# Patient Record
Sex: FEMALE | Race: WHITE | NOT HISPANIC OR LATINO | Employment: FULL TIME | URBAN - METROPOLITAN AREA
[De-identification: names, ages, dates, MRNs, and addresses within clinical notes are randomized per-mention and may not be internally consistent; named-entity substitution may affect disease eponyms.]

---

## 2017-06-14 ENCOUNTER — RADIANT APPOINTMENT (OUTPATIENT)
Dept: MAMMOGRAPHY | Facility: CLINIC | Age: 42
End: 2017-06-14
Attending: OBSTETRICS & GYNECOLOGY
Payer: COMMERCIAL

## 2017-06-14 DIAGNOSIS — Z12.31 VISIT FOR SCREENING MAMMOGRAM: ICD-10-CM

## 2017-06-14 PROCEDURE — G0202 SCR MAMMO BI INCL CAD: HCPCS | Performed by: RADIOLOGY

## 2018-06-25 ENCOUNTER — RADIANT APPOINTMENT (OUTPATIENT)
Dept: MAMMOGRAPHY | Facility: CLINIC | Age: 43
End: 2018-06-25
Attending: OBSTETRICS & GYNECOLOGY
Payer: COMMERCIAL

## 2018-06-25 DIAGNOSIS — Z12.31 VISIT FOR SCREENING MAMMOGRAM: ICD-10-CM

## 2018-06-25 PROCEDURE — 77067 SCR MAMMO BI INCL CAD: CPT | Performed by: STUDENT IN AN ORGANIZED HEALTH CARE EDUCATION/TRAINING PROGRAM

## 2019-07-03 ENCOUNTER — ANCILLARY PROCEDURE (OUTPATIENT)
Dept: MAMMOGRAPHY | Facility: CLINIC | Age: 44
End: 2019-07-03
Attending: OBSTETRICS & GYNECOLOGY
Payer: COMMERCIAL

## 2019-07-03 DIAGNOSIS — Z12.31 VISIT FOR SCREENING MAMMOGRAM: ICD-10-CM

## 2019-07-03 PROCEDURE — 77067 SCR MAMMO BI INCL CAD: CPT

## 2020-07-07 ENCOUNTER — ANCILLARY PROCEDURE (OUTPATIENT)
Dept: MAMMOGRAPHY | Facility: CLINIC | Age: 45
End: 2020-07-07
Attending: OBSTETRICS & GYNECOLOGY
Payer: COMMERCIAL

## 2020-07-07 DIAGNOSIS — Z12.31 VISIT FOR SCREENING MAMMOGRAM: ICD-10-CM

## 2020-07-07 PROCEDURE — 77067 SCR MAMMO BI INCL CAD: CPT

## 2020-12-03 ENCOUNTER — THERAPY VISIT (OUTPATIENT)
Dept: PHYSICAL THERAPY | Facility: CLINIC | Age: 45
End: 2020-12-03
Payer: COMMERCIAL

## 2020-12-03 DIAGNOSIS — M25.521 RIGHT ELBOW PAIN: ICD-10-CM

## 2020-12-03 PROCEDURE — 97110 THERAPEUTIC EXERCISES: CPT | Mod: GP | Performed by: PHYSICAL THERAPIST

## 2020-12-03 PROCEDURE — 97161 PT EVAL LOW COMPLEX 20 MIN: CPT | Mod: GP | Performed by: PHYSICAL THERAPIST

## 2020-12-03 NOTE — PROGRESS NOTES
[facility] Initial Evaluation  Subjective:    Patient Health History  Jacob Nguyen being seen for R elbow pain.     Problem began: 12/3/2019.   Problem occurred: unknown, 1month constant pain and on and off for a year   Pain is reported as 3/10 on pain scale.  General health as reported by patient is good.  Pertinent medical history includes: high blood pressure, migraines/headaches, overweight and sleep disorder/apnea.   Red flags:  None as reported by patient.   Other medical allergies details: sulfa.   Surgeries include:  Other. Other surgery history details: , foot surgery on toe, fibroid removal.    Current medications:  High blood pressure medication, muscle relaxants and pain medication. Other medications details: acid reflux, migraines, anti-anxiety.    Current occupation is  of [employer], [employer].   Primary job tasks include:  Computer work and prolonged sitting.                                    Objective:      ELBOW:   PROM L PROM R AROM L AROM R MMT L MMT R   Flex   150 150 5/5 5/5   Ext   0 0 5/5 5/5   Hyper Ext   5 5     Pronation     5/5 5/5   Supination     5/5 5/5     WRIST:   PROM L PROM R AROM L AROM R MMT L MMT R   Flex     5/5 5/5   Ext     5/5 5/5   Radial Dev         Ulnar Dev               30,25,25 31,31,25     SHOULDER:   PROM L PROM R AROM L AROM R MMT L MMT R   Flex   180 180 4/5 5/5   Abd   180 180 4/5 5/5   Full Can         Empty Can         IR         ER   70 70 5/5 4/5   Ext/IR             ELBOW/WRIST:  Palpation: TTP olecranon process and just medial to olecranon protuberance but anterior to ulnar notch  Special Testing:   L R   Ligament     Valgus 0 degrees neg neg   Valgus 30 degrees neg neg   Milking     Dynamic Milking     Lateral Epicondylitis     Resisted wrist ext/radial dev neg neg   Resisted finger extension (ECRB) neg neg   Passive stretch (wrist flex/pronation) neg neg   Medial Epicondylitis     Resisted wrist  flex/ulnar deviation neg neg   Passive stretch (wrist ext/supination) neg neg   Nerve Testing     Tinel's     Nerve tension testing         Unable to formally reproduce symptoms with special tests, end range elbow flexion reproduced symptoms mildly and compressing olecranon process reproduced symptoms mildly, suspect olecranon bursitis or unusual presentation and mechanism for an epicondylitis.         System    Physical Exam    General     ROS    Assessment/Plan:    Patient is a 45 year old female with right side elbow complaints.    Patient has the following significant findings with corresponding treatment plan.                Diagnosis 1:  R elbow pain  Pain -  hot/cold therapy, US, electric stimulation, manual therapy, education and home program  Decreased strength - therapeutic exercise, therapeutic activities and home program    Cumulative Therapy Evaluation is: Low complexity.    Previous and current functional limitations:  (See Goal Flow Sheet for this information)    Short term and Long term goals: (See Goal Flow Sheet for this information)     Communication ability:  Patient appears to be able to clearly communicate and understand verbal and written communication and follow directions correctly.  Treatment Explanation - The following has been discussed with the patient:   RX ordered/plan of care  Anticipated outcomes  Possible risks and side effects  This patient would benefit from PT intervention to resume normal activities.   Rehab potential is good.    Frequency:  1 X week, once daily  Duration:  for 4 weeks  Discharge Plan:  Achieve all LTG.  Independent in home treatment program.  Reach maximal therapeutic benefit.    Please refer to the daily flowsheet for treatment today, total treatment time and time spent performing 1:1 timed codes.

## 2020-12-03 NOTE — LETTER
[facility] [place]  [address]  [place] [address]  [phone]    December 3, 2020    Re: Jacob Nguyen   :   1975  MRN:  [id number]   REFERRING PHYSICIAN:   Haylie GUIDO [facility] [place] [facility]    Date of Initial Evaluation:  12/3/20  Visits:  Rxs Used: 1  Reason for Referral:  Right elbow pain    EVALUATION SUMMARY    [facility] Initial Evaluation  Subjective:    Patient Health History  Jacob Nguyen being seen for R elbow pain.     Problem began: 12/3/2019.   Problem occurred: unknown, 1month constant pain and on and off for a year   Pain is reported as 3/10 on pain scale.  General health as reported by patient is good.  Pertinent medical history includes: high blood pressure, migraines/headaches, overweight and sleep disorder/apnea.   Red flags:  None as reported by patient.   Other medical allergies details: sulfa.   Surgeries include:  Other. Other surgery history details: , foot surgery on toe, fibroid removal.    Current medications:  High blood pressure medication, muscle relaxants and pain medication. Other medications details: acid reflux, migraines, anti-anxiety.    Current occupation is  of [employer] found[employer], [employer].   Primary job tasks include:  Computer work and prolonged sitting.                        Objective:    ELBOW:   PROM L PROM R AROM L AROM R MMT L MMT R   Flex   150 150 5/5 5/5   Ext   0 0 5/5 5/5   Hyper Ext   5 5     Pronation     5/5 5/5   Supination     5/5 5/5     WRIST:   PROM L PROM R AROM L AROM R MMT L MMT R   Flex     5/5 5/5   Ext     5/5 5/5   Radial Dev         Ulnar Dev               30,25,25 31,31,25     SHOULDER:   PROM L PROM R AROM L AROM R MMT L MMT R   Flex   180 180 4/5 5/5   Abd   180 180 4/5 5/5   Full Can         Empty Can         IR         ER   70 70 5/5 4/5   Ext/IR             ELBOW/WRIST:  Palpation: TTP olecranon process and just medial to olecranon protuberance but anterior to ulnar  notch    Special Testing:   L R   Ligament     Valgus 0 degrees neg neg   Valgus 30 degrees neg neg   Milking     Dynamic Milking     Lateral Epicondylitis     Resisted wrist ext/radial dev neg neg   Resisted finger extension (ECRB) neg neg   Passive stretch (wrist flex/pronation) neg neg   Medial Epicondylitis     Resisted wrist flex/ulnar deviation neg neg   Passive stretch (wrist ext/supination) neg neg   Nerve Testing     Tinel's     Nerve tension testing       Unable to formally reproduce symptoms with special tests, end range elbow flexion reproduced symptoms mildly and compressing olecranon process reproduced symptoms mildly, suspect olecranon bursitis or unusual presentation and mechanism for an epicondylitis.       Assessment/Plan:    Patient is a 45 year old female with right side elbow complaints.    Patient has the following significant findings with corresponding treatment plan.                Diagnosis 1:  R elbow pain  Pain -  hot/cold therapy, US, electric stimulation, manual therapy, education and home program  Decreased strength - therapeutic exercise, therapeutic activities and home program    Cumulative Therapy Evaluation is: Low complexity.    Previous and current functional limitations:  (See Goal Flow Sheet for this information)    Short term and Long term goals: (See Goal Flow Sheet for this information)     Communication ability:  Patient appears to be able to clearly communicate and understand verbal and written communication and follow directions correctly.  Treatment Explanation - The following has been discussed with the patient:   RX ordered/plan of care  Anticipated outcomes  Possible risks and side effects  This patient would benefit from PT intervention to resume normal activities.   Rehab potential is good.    Frequency:  1 X week, once daily  Duration:  for 4 weeks  Discharge Plan:  Achieve all LTG.  Independent in home treatment program.  Reach maximal therapeutic  benefit.            Thank you for your referral.    INQUIRIES  Therapist: NEPTALI Mejia [facility]  [address]  [place] [address]  Phone: [phone]  Fax: [phone]

## 2020-12-10 ENCOUNTER — THERAPY VISIT (OUTPATIENT)
Dept: PHYSICAL THERAPY | Facility: CLINIC | Age: 45
End: 2020-12-10
Payer: COMMERCIAL

## 2020-12-10 DIAGNOSIS — M25.521 RIGHT ELBOW PAIN: ICD-10-CM

## 2020-12-10 PROCEDURE — 97010 HOT OR COLD PACKS THERAPY: CPT | Mod: GP | Performed by: PHYSICAL THERAPIST

## 2020-12-10 PROCEDURE — 97035 APP MDLTY 1+ULTRASOUND EA 15: CPT | Mod: GP | Performed by: PHYSICAL THERAPIST

## 2020-12-10 PROCEDURE — 97110 THERAPEUTIC EXERCISES: CPT | Mod: GP | Performed by: PHYSICAL THERAPIST

## 2020-12-22 ENCOUNTER — THERAPY VISIT (OUTPATIENT)
Dept: PHYSICAL THERAPY | Facility: CLINIC | Age: 45
End: 2020-12-22
Payer: COMMERCIAL

## 2020-12-22 DIAGNOSIS — M25.521 RIGHT ELBOW PAIN: ICD-10-CM

## 2020-12-22 PROCEDURE — 97110 THERAPEUTIC EXERCISES: CPT | Mod: GP | Performed by: PHYSICAL THERAPIST

## 2020-12-22 PROCEDURE — 97035 APP MDLTY 1+ULTRASOUND EA 15: CPT | Mod: GP | Performed by: PHYSICAL THERAPIST

## 2020-12-22 PROCEDURE — 97010 HOT OR COLD PACKS THERAPY: CPT | Mod: GP | Performed by: PHYSICAL THERAPIST

## 2020-12-31 ENCOUNTER — THERAPY VISIT (OUTPATIENT)
Dept: PHYSICAL THERAPY | Facility: CLINIC | Age: 45
End: 2020-12-31
Payer: COMMERCIAL

## 2020-12-31 DIAGNOSIS — M25.521 RIGHT ELBOW PAIN: ICD-10-CM

## 2020-12-31 PROCEDURE — 97110 THERAPEUTIC EXERCISES: CPT | Mod: GP | Performed by: PHYSICAL THERAPIST

## 2020-12-31 PROCEDURE — 97035 APP MDLTY 1+ULTRASOUND EA 15: CPT | Mod: GP | Performed by: PHYSICAL THERAPIST

## 2020-12-31 NOTE — LETTER
BRODIE GUIDO [facility] [place]  [address]  [place] [address]  [phone]    2021    Re: Jacob Nguyen   :   1975  MRN:  [id number]   REFERRING PHYSICIAN:   [place]lie GUIDO [facility] [place]    Date of Initial Evaluation:  12/3/20  Visits:  Rxs Used: 4  Reason for Referral:  Right elbow pain    PROGRESS  REPORT  Progress reporting period is from 12/3/20 to 20.     SUBJECTIVE  Subjective: pt reports she was flared up for a day or two but then it has been ok since, no pain. has been avoiding/modifying how she moves, feels some tightness when doing elbow flexion but not pain. tender to push on elbow just occasionally and if she is flared up only, not constant tenderness life prior to starting PT.   Current Pain level: 0/10   Initial Pain level: 3/10   Changes in function: Yes, see goal flow sheet for change in function   Adverse reactions: None    The objective findings are from DOS 20.    OBJECTIVE  Objective: valgus and varus testing negative, mild tenderness over olecranon process, able to perform sup<>sit transfers with 1/10 pain/discomfort.      ASSESSMENT/PLAN  Updated problem list and treatment plan: Diagnosis 1:  R elbow pain  STG/LTGs have been met or progress has been made towards goals:  Yes, progressing to painfree bed mobility  Assessment of Progress: The patient's condition is improving.  Self Management Plans:  Patient has been instructed in a home treatment program.  I have re-evaluated this patient and find that the nature, scope, duration and intensity of the therapy is appropriate for the medical condition of the patient.  Jacob continues to require the following intervention to meet STG and LTG's: PT    Recommendations:  Jacob has made some improvement in R elbow pain but still has some pain with end range elbow flexion and when placing pressure onto elbow and she would benefit from further PT services, 1x every 2weeks [id number].    Thank you for your  referral.    INQUIRIES  Therapist: NEPTALI Mejia [facility]  [address]  [place] [address]  Phone: [phone]  Fax: [phone]

## 2020-12-31 NOTE — PROGRESS NOTES
Subjective:  HPI  Physical Exam                    Objective:  System    Physical Exam    General     ROS    Assessment/Plan:    PROGRESS  REPORT    Progress reporting period is from 12/3/20 to 12/31/20.     SUBJECTIVE  Subjective: pt reports she was flared up for a day or two but then it has been ok since, no pain. has been avoiding/modifying how she moves, feels some tightness when doing elbow flexion but not pain. tender to push on elbow just occasionally and if she is flared up only, not constant tenderness life prior to starting PT.   Current Pain level: 0/10   Initial Pain level: 3/10   Changes in function: Yes, see goal flow sheet for change in function   Adverse reactions: None;   ,     The objective findings are from DOS 12/31/20.    OBJECTIVE  Objective: valgus and varus testing negative, mild tenderness over olecranon process, able to perform sup<>sit transfers with 1/10 pain/discomfort.      ASSESSMENT/PLAN  Updated problem list and treatment plan: Diagnosis 1:  R elbow pain  STG/LTGs have been met or progress has been made towards goals:  Yes, progressing to painfree bed mobility  Assessment of Progress: The patient's condition is improving.  Self Management Plans:  Patient has been instructed in a home treatment program.  I have re-evaluated this patient and find that the nature, scope, duration and intensity of the therapy is appropriate for the medical condition of the patient.  Jacob continues to require the following intervention to meet STG and LTG's: PT    Recommendations:  Jacob has made some improvement in R elbow pain but still has some pain with end range elbow flexion and when placing pressure onto elbow and she would benefit from further PT services, 1x every 2weeks [id number].    Please refer to the daily flowsheet for treatment today, total treatment time and time spent performing 1:1 timed codes.

## 2021-01-15 ENCOUNTER — HEALTH MAINTENANCE LETTER (OUTPATIENT)
Age: 46
End: 2021-01-15

## 2021-07-14 ENCOUNTER — ANCILLARY PROCEDURE (OUTPATIENT)
Dept: MAMMOGRAPHY | Facility: CLINIC | Age: 46
End: 2021-07-14
Attending: OBSTETRICS & GYNECOLOGY
Payer: COMMERCIAL

## 2021-07-14 DIAGNOSIS — Z12.31 VISIT FOR SCREENING MAMMOGRAM: ICD-10-CM

## 2021-07-14 PROCEDURE — 77067 SCR MAMMO BI INCL CAD: CPT | Mod: GC | Performed by: RADIOLOGY

## 2021-07-14 PROCEDURE — 77063 BREAST TOMOSYNTHESIS BI: CPT | Mod: GC | Performed by: RADIOLOGY

## 2021-07-23 ENCOUNTER — HOSPITAL ENCOUNTER (OUTPATIENT)
Dept: MAMMOGRAPHY | Facility: CLINIC | Age: 46
End: 2021-07-23
Attending: OBSTETRICS & GYNECOLOGY
Payer: COMMERCIAL

## 2021-07-23 DIAGNOSIS — R92.8 ABNORMAL MAMMOGRAM: ICD-10-CM

## 2021-07-23 PROCEDURE — 77065 DX MAMMO INCL CAD UNI: CPT | Mod: RT

## 2021-09-04 ENCOUNTER — HEALTH MAINTENANCE LETTER (OUTPATIENT)
Age: 46
End: 2021-09-04

## 2021-11-17 ENCOUNTER — MEDICAL CORRESPONDENCE (OUTPATIENT)
Dept: MAMMOGRAPHY | Facility: CLINIC | Age: 46
End: 2021-11-17
Payer: COMMERCIAL

## 2022-01-12 ENCOUNTER — ANCILLARY PROCEDURE (OUTPATIENT)
Dept: MAMMOGRAPHY | Facility: CLINIC | Age: 47
End: 2022-01-12
Attending: OBSTETRICS & GYNECOLOGY
Payer: COMMERCIAL

## 2022-01-12 DIAGNOSIS — R92.30 DENSE BREAST: ICD-10-CM

## 2022-01-12 PROCEDURE — 77065 DX MAMMO INCL CAD UNI: CPT | Mod: RT

## 2022-02-19 ENCOUNTER — HEALTH MAINTENANCE LETTER (OUTPATIENT)
Age: 47
End: 2022-02-19

## 2022-10-16 ENCOUNTER — HEALTH MAINTENANCE LETTER (OUTPATIENT)
Age: 47
End: 2022-10-16

## 2022-11-09 ENCOUNTER — THERAPY VISIT (OUTPATIENT)
Dept: PHYSICAL THERAPY | Facility: CLINIC | Age: 47
End: 2022-11-09
Payer: COMMERCIAL

## 2022-11-09 DIAGNOSIS — M54.2 NECK PAIN: ICD-10-CM

## 2022-11-09 DIAGNOSIS — G89.29 CHRONIC BACK PAIN: Primary | ICD-10-CM

## 2022-11-09 DIAGNOSIS — M54.9 CHRONIC BACK PAIN: Primary | ICD-10-CM

## 2022-11-09 PROCEDURE — 97110 THERAPEUTIC EXERCISES: CPT | Mod: GP | Performed by: PHYSICAL THERAPIST

## 2022-11-09 PROCEDURE — 97161 PT EVAL LOW COMPLEX 20 MIN: CPT | Mod: GP | Performed by: PHYSICAL THERAPIST

## 2022-11-09 NOTE — LETTER
[facility] [place]  [address] [place] [address]  [place] [address]  [phone]    November 10, 2022    Re: Jacob Nguyen   :   1975  MRN:  [id number]   REFERRING PHYSICIAN:   Karine BENZ [facility] [place]    Date of Initial Evaluation:  2022  Visits:  Rxs Used: 1  Reason for Referral:     Chronic back pain  Neck pain    EVALUATION SUMMARY    Physical Therapy Initial Evaluation  Subjective:    Patient Health History  Jacob Nguyen being seen for back and neck issues.     Problem began: 2022.   Problem occurred: stress   Pain is reported as 3/10 on pain scale.  General health as reported by patient is good.  Pertinent medical history includes: high blood pressure, history of fractures, menopausal, mental illness, migraines/headaches and sleep disorder/apnea.   Red flags:  None as reported by patient.   Other medical allergies details: sulfa, eurethomycin.   Surgeries include:  Orthopedic surgery and other. Other surgery history details: toe joint, .    Current medications:  High blood pressure medication and muscle relaxants. Other medications details: anxiety, GERD, migraines, steroids as needed for migraines.    Current occupation is [employer].   Primary job tasks include:  Computer work and prolonged sitting.                Pt has a chronic history of LBP, recently went to [place] and had increased pain with prolonged standing and walking. She has also had increased neck pain over the past few months and believes this is due to recent increased demands and stress at work.    Objective:    LUMBAR:    Posture: slouched sitting        Re: Jacob Nguyen   :   1975    Neurological:    Motor Deficit:  Myotomes L R   L1-2 (hip flexion) 5/5 5/5   L3 (knee extension) 5/5 5/5   L4 (ankle DF) 5/5 5/5   L5 (g. toe ext)     S1 (ankle PF or knee flex) 5/5 5/5     Sensory Deficit, Reflexes: normal  light touch sensation     Dural Signs:   L R   Slump neg neg   SLR neg neg   Other:     AROM: (Major, Moderate, Minimal or Nil loss)  Movement Loss Abhishek Mod Min Nil Pain   Flexion   x     Extension    x    Side Gliding L    x    Side Gliding R    x + on R     Repeated movement testing:   (During: produces, abolishes, increases, decreases, no effect, centralizing, peripheralizing; After: better, worse, no better, no worse, no effect, centralized, peripheralized)    Pre-test Symptoms Standing:    Symptoms During Symptoms After ROM increased ROM decreased No Effect   FIS     x   Rep FIS     x   EIS     x   Rep EIS     x     If required Pre-test Symptoms:    Symptoms During Symptoms After ROM increased ROM decreased No Effect   SGIS - L     x   Rep SGIS - L     x   SGIS - R x       Rep SGIS - R x    same     Static Tests: spring testing negative but tender over L L5-S1  Other Tests: prefers seated/flexed positions however will get relief from standing walking but increased standing/walking causes increased pain with relief from sitting  Re: Jacob Nguyen   :   1975    System  Physical Exam    Assessment/Plan:    Patient is a 47 year old female with lumbar and cervical complaints.    Patient has the following significant findings with corresponding treatment plan.                Diagnosis 1:  Back and neck  Pain -  hot/cold therapy, manual therapy, education, directional preference exercise and home program  Decreased ROM/flexibility - manual therapy, therapeutic exercise, therapeutic activity and home program  Impaired posture - neuro re-education and therapeutic activities    Cumulative Therapy Evaluation is: Low complexity.  Previous and current functional limitations:  (See Goal Flow Sheet for this information)    Short term and Long term goals: (See Goal Flow Sheet for this information)   Communication ability:  Patient appears to be able to clearly communicate and understand verbal and written  communication and follow directions correctly.  Treatment Explanation - The following has been discussed with the patient:   RX ordered/plan of care  Anticipated outcomes  Possible risks and side effects  This patient would benefit from PT intervention to resume normal activities.   Rehab potential is fair.  Frequency:  1 X week, once daily  Duration:  for 12 weeks  Discharge Plan:  Achieve all LTG.  Independent in home treatment program.  Reach maximal therapeutic benefit.    Thank you for your referral.    INQUIRIES  Therapist: Simon Ovalle PT  [address] [address]  Phone: [phone]  Fax: [phone]

## 2022-11-09 NOTE — PROGRESS NOTES
Physical Therapy Initial Evaluation  Subjective:    Patient Health History  Jacob Nguyen being seen for back and neck issues.     Problem began: 2022.   Problem occurred: stress   Pain is reported as 3/10 on pain scale.  General health as reported by patient is good.  Pertinent medical history includes: high blood pressure, history of fractures, menopausal, mental illness, migraines/headaches and sleep disorder/apnea.   Red flags:  None as reported by patient.   Other medical allergies details: sulfa, eurethomycin.   Surgeries include:  Orthopedic surgery and other. Other surgery history details: toe joint, .    Current medications:  High blood pressure medication and muscle relaxants. Other medications details: anxiety, GERD, migraines, steroids as needed for migraines.    Current occupation is [employer].   Primary job tasks include:  Computer work and prolonged sitting.                                Pt has a chronic history of LBP, recently went to [employer] and had increased pain with prolonged standing and walking. She has also had increased neck pain over the past few months and believes this is due to recent increased demands and stress at work.    Objective:      LUMBAR:    Posture: slouched sitting    Neurological:    Motor Deficit:  Myotomes L R   L1-2 (hip flexion) 5/5 5/5   L3 (knee extension) 5/5 5/5   L4 (ankle DF) 5/5 5/5   L5 (g. toe ext)     S1 (ankle PF or knee flex) 5/5 5/5     Sensory Deficit, Reflexes: normal light touch sensation     Dural Signs:   L R   Slump neg neg   SLR neg neg   Other:     AROM: (Major, Moderate, Minimal or Nil loss)  Movement Loss Abhishek Mod Min Nil Pain   Flexion   x     Extension    x    Side Gliding L    x    Side Gliding R    x + on R     Repeated movement testing:   (During: produces, abolishes, increases, decreases, no effect, centralizing, peripheralizing; After: better, worse, no better, no worse, no effect, centralized,  peripheralized)    Pre-test Symptoms Standing:    Symptoms During Symptoms After ROM increased ROM decreased No Effect   FIS     x   Rep FIS     x   EIS     x   Rep EIS     x     If required Pre-test Symptoms:    Symptoms During Symptoms After ROM increased ROM decreased No Effect   SGIS - L     x   Rep SGIS - L     x   SGIS - R x       Rep SGIS - R x    same     Static Tests: spring testing negative but tender over L L5-S1  Other Tests: prefers seated/flexed positions however will get relief from standing walking but increased standing/walking causes increased pain with relief from sitting            System    Physical Exam    General     ROS    Assessment/Plan:    Patient is a 47 year old female with lumbar and cervical complaints.    Patient has the following significant findings with corresponding treatment plan.                Diagnosis 1:  Back and neck  Pain -  hot/cold therapy, manual therapy, education, directional preference exercise and home program  Decreased ROM/flexibility - manual therapy, therapeutic exercise, therapeutic activity and home program  Impaired posture - neuro re-education and therapeutic activities    Cumulative Therapy Evaluation is: Low complexity.    Previous and current functional limitations:  (See Goal Flow Sheet for this information)    Short term and Long term goals: (See Goal Flow Sheet for this information)     Communication ability:  Patient appears to be able to clearly communicate and understand verbal and written communication and follow directions correctly.  Treatment Explanation - The following has been discussed with the patient:   RX ordered/plan of care  Anticipated outcomes  Possible risks and side effects  This patient would benefit from PT intervention to resume normal activities.   Rehab potential is fair.    Frequency:  1 X week, once daily  Duration:  for 12 weeks  Discharge Plan:  Achieve all LTG.  Independent in home treatment program.  Reach maximal  therapeutic benefit.    Please refer to the daily flowsheet for treatment today, total treatment time and time spent performing 1:1 timed codes.

## 2022-11-28 ENCOUNTER — THERAPY VISIT (OUTPATIENT)
Dept: PHYSICAL THERAPY | Facility: CLINIC | Age: 47
End: 2022-11-28
Payer: COMMERCIAL

## 2022-11-28 DIAGNOSIS — M54.9 CHRONIC BACK PAIN: Primary | ICD-10-CM

## 2022-11-28 DIAGNOSIS — G89.29 CHRONIC BACK PAIN: Primary | ICD-10-CM

## 2022-11-28 PROCEDURE — 97110 THERAPEUTIC EXERCISES: CPT | Mod: GP | Performed by: PHYSICAL THERAPIST

## 2022-12-06 ENCOUNTER — THERAPY VISIT (OUTPATIENT)
Dept: PHYSICAL THERAPY | Facility: CLINIC | Age: 47
End: 2022-12-06
Payer: COMMERCIAL

## 2022-12-06 DIAGNOSIS — G89.29 CHRONIC BACK PAIN: Primary | ICD-10-CM

## 2022-12-06 DIAGNOSIS — M54.9 CHRONIC BACK PAIN: Primary | ICD-10-CM

## 2022-12-06 PROCEDURE — 97110 THERAPEUTIC EXERCISES: CPT | Mod: GP | Performed by: PHYSICAL THERAPIST

## 2022-12-13 ENCOUNTER — THERAPY VISIT (OUTPATIENT)
Dept: PHYSICAL THERAPY | Facility: CLINIC | Age: 47
End: 2022-12-13
Payer: COMMERCIAL

## 2022-12-13 DIAGNOSIS — M54.9 CHRONIC BACK PAIN: Primary | ICD-10-CM

## 2022-12-13 DIAGNOSIS — G89.29 CHRONIC BACK PAIN: Primary | ICD-10-CM

## 2022-12-13 PROCEDURE — 97110 THERAPEUTIC EXERCISES: CPT | Mod: GP | Performed by: PHYSICAL THERAPIST

## 2022-12-30 ENCOUNTER — THERAPY VISIT (OUTPATIENT)
Dept: PHYSICAL THERAPY | Facility: CLINIC | Age: 47
End: 2022-12-30
Payer: COMMERCIAL

## 2022-12-30 DIAGNOSIS — G89.29 CHRONIC BACK PAIN: Primary | ICD-10-CM

## 2022-12-30 DIAGNOSIS — M54.9 CHRONIC BACK PAIN: Primary | ICD-10-CM

## 2022-12-30 PROCEDURE — 97110 THERAPEUTIC EXERCISES: CPT | Mod: GP | Performed by: PHYSICAL THERAPIST

## 2023-02-02 ENCOUNTER — THERAPY VISIT (OUTPATIENT)
Dept: PHYSICAL THERAPY | Facility: CLINIC | Age: 48
End: 2023-02-02
Payer: COMMERCIAL

## 2023-02-02 DIAGNOSIS — M54.9 CHRONIC BACK PAIN: ICD-10-CM

## 2023-02-02 DIAGNOSIS — M54.2 NECK PAIN: Primary | ICD-10-CM

## 2023-02-02 DIAGNOSIS — G89.29 CHRONIC BACK PAIN: ICD-10-CM

## 2023-02-02 PROCEDURE — 97110 THERAPEUTIC EXERCISES: CPT | Mod: GP | Performed by: PHYSICAL THERAPIST

## 2023-02-02 NOTE — PROGRESS NOTES
Subjective:  HPI  Physical Exam  Oswestry Score: 12 %                 Objective:      System    Physical Exam    General     ROS    Assessment/Plan:    PROGRESS  REPORT    Progress reporting period is from 11/19/22 to 2/2/23.     SUBJECTIVE  Subjective: pt reports falling onto tailbone causing increased sacral pain, low back hasn't been bad and she has been skiing but her neck has been more stiff. she admits to testing positive for covid on monday 1/23, is day 10 today with minimal improving symptoms.   Current Pain level: 2/10   Initial Pain level: 3/10  The objective findings are from DOS 2/2/23.    HENRIK: 12  NDI: 14    OBJECTIVE  Objective: AROM cervical ext: 45degs flx: 1finger sidebending L: 26degs R: 25degs rotation L:72degs R: 63degs. sharps pursor negative, shear test and kick tests negative, spurling's negative, soft tissue tightness in scalenes, traps, levator scaps      ASSESSMENT/PLAN  Updated problem list and treatment plan: Diagnosis 1:  Neck and back pain  STG/LTGs have been met or progress has been made towards goals:  Yes,  Assessment of Progress: The patient's condition is improving.  The patient's condition has potential to improve.  Self Management Plans:  Patient is independent in a home treatment program.  I have re-evaluated this patient and find that the nature, scope, duration and intensity of the therapy is appropriate for the medical condition of the patient.  Jacob continues to require the following intervention to meet STG and LTG's: PT      Recommendations:  Jacob has been seen for 6 PT visits for back and neck pain with first 5 visits focusing on low back and today transitioning to neck treatment. She has some overall improvement with low back pain but had a fall onto tailbone which has been more painful than low back recently. She would like to focus on neck for a few sessions. Currently we have 6 remaining visits that we will focus on neck treatment, 2x per month [id number].    Please  refer to the daily flowsheet for treatment today, total treatment time and time spent performing 1:1 timed codes.

## 2023-02-02 NOTE — LETTER
[facility]  [address] [place] [address]  [place] [address]  [phone]    February 3, 2023    Re: Jacob Nguyen   :   1975  MRN:  [id number]   REFERRING PHYSICIAN:   Karine BENZ [facility]    Date of Initial Evaluation:  2022  Visits:  Rxs Used: 6  Reason for Referral:     Chronic back pain  Neck pain    PROGRESS  REPORT    Progress reporting period is from 22 to 23.     SUBJECTIVE  Subjective: pt reports falling onto tailbone causing increased sacral pain, low back hasn't been bad and she has been skiing but her neck has been more stiff. she admits to testing positive for covid on , is day 10 today with minimal improving symptoms.   Current Pain level: 2/10   Initial Pain level: 3/10  The objective findings are from DOS 23.    HENRIK: 12  NDI: 14    OBJECTIVE  Objective: AROM cervical ext: 45degs flx: 1finger sidebending L: 26degs R: 25degs rotation L:72degs R: 63degs. sharps pursor negative, shear test and kick tests negative, spurling's negative, soft tissue tightness in scalenes, traps, levator scaps      ASSESSMENT/PLAN  Updated problem list and treatment plan: Diagnosis 1:  Neck and back pain  STG/LTGs have been met or progress has been made towards goals:  Yes,  Assessment of Progress: The patient's condition is improving.  The patient's condition has potential to improve.  Self Management Plans:  Patient is independent in a home treatment program.  I have re-evaluated this patient and find that the nature, scope, duration and intensity of the therapy is appropriate for the medical condition of the patient.  Jacob continues to require the following intervention to meet STG and LTG's: PT  Re: Jacob Nguyen   :   1975    Recommendations:  Jacob has been seen for 6 PT visits for back and neck pain with first 5 visits focusing on low back and today transitioning to  neck treatment. She has some overall improvement with low back pain but [facility] been more painful than low back recently. She would like to focus on neck for a few sessions. Currently we have 6 remaining visits that we will focus on neck treatment, 2x per month [id number].    Thank you for your referral.    INQUIRIES  Therapist: Simon Ovalle PT M [facility] [place] [address]  [place] [address]  Phone: [phone]  Fax: [phone]

## 2023-02-06 ENCOUNTER — ANCILLARY PROCEDURE (OUTPATIENT)
Dept: MAMMOGRAPHY | Facility: CLINIC | Age: 48
End: 2023-02-06
Attending: OBSTETRICS & GYNECOLOGY
Payer: COMMERCIAL

## 2023-02-06 DIAGNOSIS — Z12.31 VISIT FOR SCREENING MAMMOGRAM: ICD-10-CM

## 2023-02-06 PROCEDURE — 77063 BREAST TOMOSYNTHESIS BI: CPT

## 2023-02-06 PROCEDURE — 77067 SCR MAMMO BI INCL CAD: CPT

## 2023-02-10 ENCOUNTER — TRANSCRIBE ORDERS (OUTPATIENT)
Dept: OTHER | Age: 48
End: 2023-02-10

## 2023-02-10 DIAGNOSIS — R68.89 NECK PROBLEM: ICD-10-CM

## 2023-02-10 DIAGNOSIS — M79.641 PAIN OF RIGHT HAND: Primary | ICD-10-CM

## 2023-02-10 DIAGNOSIS — G89.29 CHRONIC BACK PAIN, UNSPECIFIED BACK LOCATION, UNSPECIFIED BACK PAIN LATERALITY: ICD-10-CM

## 2023-02-10 DIAGNOSIS — M54.9 CHRONIC BACK PAIN, UNSPECIFIED BACK LOCATION, UNSPECIFIED BACK PAIN LATERALITY: ICD-10-CM

## 2023-02-20 ENCOUNTER — THERAPY VISIT (OUTPATIENT)
Dept: PHYSICAL THERAPY | Facility: CLINIC | Age: 48
End: 2023-02-20
Payer: COMMERCIAL

## 2023-02-20 DIAGNOSIS — M54.9 CHRONIC BACK PAIN: Primary | ICD-10-CM

## 2023-02-20 DIAGNOSIS — G89.29 CHRONIC BACK PAIN: Primary | ICD-10-CM

## 2023-02-20 PROCEDURE — 97110 THERAPEUTIC EXERCISES: CPT | Mod: GP | Performed by: PHYSICAL THERAPIST

## 2023-02-20 PROCEDURE — 97140 MANUAL THERAPY 1/> REGIONS: CPT | Mod: GP | Performed by: PHYSICAL THERAPIST

## 2023-03-27 ENCOUNTER — THERAPY VISIT (OUTPATIENT)
Dept: PHYSICAL THERAPY | Facility: CLINIC | Age: 48
End: 2023-03-27
Payer: COMMERCIAL

## 2023-03-27 DIAGNOSIS — M54.9 CHRONIC BACK PAIN: Primary | ICD-10-CM

## 2023-03-27 DIAGNOSIS — G89.29 CHRONIC BACK PAIN: Primary | ICD-10-CM

## 2023-03-27 PROCEDURE — 97110 THERAPEUTIC EXERCISES: CPT | Mod: GP | Performed by: PHYSICAL THERAPIST

## 2023-03-27 PROCEDURE — 97140 MANUAL THERAPY 1/> REGIONS: CPT | Mod: GP | Performed by: PHYSICAL THERAPIST

## 2023-03-27 NOTE — PROGRESS NOTES
Subjective:  HPI  Physical Exam  Oswestry Score: 10 %                 Objective:  System    Physical Exam    General     ROS    Assessment/Plan:    PROGRESS  REPORT    Progress reporting period is from 2/2/23 to 3/27/23.     SUBJECTIVE  Subjective: pt reports low back has been somewhat bothersome after doing more house work and car cleaning recently. feels the tailbone pain has improved recently too but will be going on ski trip next week and hoping she doesn't fall on tailbone again.   Current Pain level: 2/10   Initial Pain level: 3/10   Changes in function: Yes, see goal flow sheet for change in function   Adverse reactions: None;   ,     The objective findings are from DOS 3/27/23.    NDI: 14%  HENRIK: 10%    OBJECTIVE  Objective: AROM cervical ext:45degs flx:1finger sidebending L:31degs R:37degs rotation L:71degs R:66degs  AROM lumbar flx: to ankles painfree ext:full and painfree BSB: equal with stiffness on L low back with BSB. muscle guarding in R lumbar paraspinals      ASSESSMENT/PLAN  Updated problem list and treatment plan: Diagnosis 1:  Neck and back pain  STG/LTGs have been met or progress has been made towards goals:  Yes, progressing painfree walking tolerance  Assessment of Progress: The patient's condition is improving.  The patient's condition has potential to improve.  Self Management Plans:  Patient has been instructed in a home treatment program.  Patient is independent in a home treatment program.  Jacob continues to require the following intervention to meet STG and LTG's: PT    Recommendations:  This patient would benefit from continued therapy.     Frequency:  2 X a month, once daily  Duration:  for 2 months        Please refer to the daily flowsheet for treatment today, total treatment time and time spent performing 1:1 timed codes.

## 2023-05-15 ENCOUNTER — THERAPY VISIT (OUTPATIENT)
Dept: PHYSICAL THERAPY | Facility: CLINIC | Age: 48
End: 2023-05-15
Payer: COMMERCIAL

## 2023-05-15 DIAGNOSIS — M54.9 CHRONIC BACK PAIN: Primary | ICD-10-CM

## 2023-05-15 DIAGNOSIS — G89.29 CHRONIC BACK PAIN: Primary | ICD-10-CM

## 2023-05-15 PROCEDURE — 97110 THERAPEUTIC EXERCISES: CPT | Mod: GP | Performed by: PHYSICAL THERAPIST

## 2023-11-09 ENCOUNTER — LAB REQUISITION (OUTPATIENT)
Dept: LAB | Facility: CLINIC | Age: 48
End: 2023-11-09

## 2023-11-09 DIAGNOSIS — Z01.419 ENCOUNTER FOR GYNECOLOGICAL EXAMINATION (GENERAL) (ROUTINE) WITHOUT ABNORMAL FINDINGS: ICD-10-CM

## 2023-11-09 PROCEDURE — 87624 HPV HI-RISK TYP POOLED RSLT: CPT | Performed by: OBSTETRICS & GYNECOLOGY

## 2023-11-09 PROCEDURE — G0145 SCR C/V CYTO,THINLAYER,RESCR: HCPCS | Performed by: OBSTETRICS & GYNECOLOGY

## 2023-11-14 LAB
BKR LAB AP GYN ADEQUACY: NORMAL
BKR LAB AP GYN INTERPRETATION: NORMAL
BKR LAB AP HPV REFLEX: NORMAL
BKR LAB AP LMP: NORMAL
BKR LAB AP PREVIOUS ABNL DX: NORMAL
BKR LAB AP PREVIOUS ABNORMAL: NORMAL
PATH REPORT.COMMENTS IMP SPEC: NORMAL
PATH REPORT.COMMENTS IMP SPEC: NORMAL
PATH REPORT.RELEVANT HX SPEC: NORMAL

## 2023-11-16 LAB
HUMAN PAPILLOMA VIRUS 16 DNA: NEGATIVE
HUMAN PAPILLOMA VIRUS 18 DNA: NEGATIVE
HUMAN PAPILLOMA VIRUS FINAL DIAGNOSIS: NORMAL
HUMAN PAPILLOMA VIRUS OTHER HR: NEGATIVE

## 2024-02-07 ENCOUNTER — ANCILLARY PROCEDURE (OUTPATIENT)
Dept: MAMMOGRAPHY | Facility: CLINIC | Age: 49
End: 2024-02-07
Attending: OBSTETRICS & GYNECOLOGY
Payer: COMMERCIAL

## 2024-02-07 DIAGNOSIS — Z12.31 VISIT FOR SCREENING MAMMOGRAM: ICD-10-CM

## 2024-02-07 PROCEDURE — 77067 SCR MAMMO BI INCL CAD: CPT | Performed by: STUDENT IN AN ORGANIZED HEALTH CARE EDUCATION/TRAINING PROGRAM

## 2024-02-07 PROCEDURE — 77063 BREAST TOMOSYNTHESIS BI: CPT | Performed by: STUDENT IN AN ORGANIZED HEALTH CARE EDUCATION/TRAINING PROGRAM

## 2024-06-01 ENCOUNTER — HEALTH MAINTENANCE LETTER (OUTPATIENT)
Age: 49
End: 2024-06-01

## 2024-08-07 ENCOUNTER — THERAPY VISIT (OUTPATIENT)
Dept: PHYSICAL THERAPY | Facility: CLINIC | Age: 49
End: 2024-08-07
Payer: COMMERCIAL

## 2024-08-07 DIAGNOSIS — M54.2 NECK PAIN: Primary | ICD-10-CM

## 2024-08-07 PROCEDURE — 97110 THERAPEUTIC EXERCISES: CPT | Mod: GP | Performed by: PHYSICAL THERAPIST

## 2024-08-07 PROCEDURE — 97161 PT EVAL LOW COMPLEX 20 MIN: CPT | Mod: GP | Performed by: PHYSICAL THERAPIST

## 2024-08-07 NOTE — PROGRESS NOTES
PHYSICAL THERAPY EVALUATION  Type of Visit: Evaluation    Pt presents to PT with chronic neck pain that is worse with prolonged sitting, she sees a massage therapist regularly without improvement.           Fall Risk Screen:  Fall screen completed by: PT  Have you fallen 2 or more times in the past year?: No  Have you fallen and had an injury in the past year?: No  Is patient a fall risk?: No    Subjective       Presenting condition or subjective complaint: neck pain  Date of onset: 07/15/24    Relevant medical history: Arthritis; High blood pressure; Migraines or headaches; Sleep disorder like apnea   Dates & types of surgery: C section '14, toe surgery '11    Prior diagnostic imaging/testing results:       Prior therapy history for the same diagnosis, illness or injury: Yes PT    Prior Level of Function  Transfers:   Ambulation:   ADL:   IADL:     Living Environment  Social support: With family members   Type of home: House; 2-story   Stairs to enter the home: Yes 3 Is there a railing: Yes     Ramp: No   Stairs inside the home: Yes 30 Is there a railing: Yes     Help at home: None  Equipment owned:       Employment: Yes foundry  Hobbies/Interests: skiing, tennis, dancing    Patient goals for therapy: turn head and sleep painfree    Pain assessment: pain in neck with sitting and laying down     Objective   CERVICAL SPINE EVALUATION  PAIN:   INTEGUMENTARY (edema, incisions):   POSTURE: forward head and rounded shoulders  GAIT:   Weightbearing Status:   Assistive Device(s):   Gait Deviations:   BALANCE/PROPRIOCEPTION:   WEIGHTBEARING ALIGNMENT:   ROM: cervical AROM ext: 45degs flx: to chest SB L: 22degs R: 25degs rotation L: 57degs R: 52degs   MYOTOMES: painful and weak BUE globally  DTR S:   CORD SIGNS:   DERMATOMES: normal light touch sensation  NEURAL TENSION: neg  FLEXIBILITY: soft tissue tightness in B UT and LS, mid scalene  SPECIAL TESTS: spurling neg, ligament tests neg sharps pursor, shear, kick  PALPATION:  mild TTP UT  SPINAL SEGMENTAL CONCLUSIONS: C2-6 hypomobility    Assessment & Plan   CLINICAL IMPRESSIONS  Medical Diagnosis: neck pain    Treatment Diagnosis: neck pain   Impression/Assessment: Patient is a 49 year old female with neck pain complaints.  The following significant findings have been identified: Pain, Decreased ROM/flexibility, Decreased joint mobility, Decreased strength, and Impaired posture. These impairments interfere with their ability to perform work tasks, recreational activities, and household chores as compared to previous level of function.     Clinical Decision Making (Complexity):  Clinical Presentation: Stable/Uncomplicated  Clinical Presentation Rationale: based on medical and personal factors listed in PT evaluation  Clinical Decision Making (Complexity): Low complexity    PLAN OF CARE  Treatment Interventions:  Modalities: Cryotherapy, Hot Pack  Interventions: Manual Therapy, Neuromuscular Re-education, Therapeutic Activity, Therapeutic Exercise    Long Term Goals     PT Goal 1  Goal Identifier: sitting  Goal Description: pt will be able to sit 60mins painfree  Rationale: to maximize safety and independence within the community  Target Date: 10/30/24      Frequency of Treatment: 1x/wk  Duration of Treatment: 12wks    Recommended Referrals to Other Professionals:   Education Assessment:   Learner/Method: Patient;Demonstration;Pictures/Video  Education Comments: spine anatomy and mechanics, posture during sitting, standing, rehab progression and expectations    Risks and benefits of evaluation/treatment have been explained.   Patient/Family/caregiver agrees with Plan of Care.     Evaluation Time:     PT Eval, Low Complexity Minutes (18311): 15       Signing Clinician: Simon Ovalle PT

## 2024-08-22 ENCOUNTER — TELEPHONE (OUTPATIENT)
Dept: PHYSICAL THERAPY | Facility: CLINIC | Age: 49
End: 2024-08-22

## 2024-09-10 ENCOUNTER — THERAPY VISIT (OUTPATIENT)
Dept: PHYSICAL THERAPY | Facility: CLINIC | Age: 49
End: 2024-09-10
Payer: COMMERCIAL

## 2024-09-10 DIAGNOSIS — M54.2 NECK PAIN: Primary | ICD-10-CM

## 2024-09-10 PROCEDURE — 97140 MANUAL THERAPY 1/> REGIONS: CPT | Mod: GP | Performed by: PHYSICAL THERAPIST

## 2024-09-10 PROCEDURE — 97110 THERAPEUTIC EXERCISES: CPT | Mod: GP | Performed by: PHYSICAL THERAPIST

## 2024-10-09 ENCOUNTER — THERAPY VISIT (OUTPATIENT)
Dept: PHYSICAL THERAPY | Facility: CLINIC | Age: 49
End: 2024-10-09
Payer: COMMERCIAL

## 2024-10-09 DIAGNOSIS — M54.2 NECK PAIN: Primary | ICD-10-CM

## 2024-10-09 PROCEDURE — 97110 THERAPEUTIC EXERCISES: CPT | Mod: GP | Performed by: PHYSICAL THERAPIST

## 2024-10-09 PROCEDURE — 97140 MANUAL THERAPY 1/> REGIONS: CPT | Mod: GP | Performed by: PHYSICAL THERAPIST

## 2024-10-09 PROCEDURE — 97010 HOT OR COLD PACKS THERAPY: CPT | Mod: GP | Performed by: PHYSICAL THERAPIST

## 2024-10-09 NOTE — PROGRESS NOTES
"   10/09/24 0500   Appointment Info   Signing clinician's name / credentials Simon Ovalle DPT   Total/Authorized Visits 12   Visits Used 3   Medical Diagnosis neck pain   PT Tx Diagnosis neck pain   Other pertinent information \"ONdrea\"   Progress Note/Certification   Onset of illness/injury or Date of Surgery 07/15/24   Therapy Frequency 1x/wk   Predicted Duration 12wks   Progress Note Due Date 11/20/24   Progress Note Completed Date 10/09/24   PT Goal 1   Goal Identifier sitting   Goal Description pt will be able to sit 60mins painfree   Rationale to maximize safety and independence within the community   Target Date 10/30/24   Objective Measure 1   Objective Measure cervical AROM   Details ext: 50degs flx: to chest SB L: 25degs R: 40degs rotation L: 55degs R: 55degs   Objective Measure 2   Objective Measure spurling's + on R   PT Modalities   PT Modalities Hot Packs   Hot Pack   Hot Pack Minutes (17879) 10   Patient Response/Progress relief   Treatment Detail moist heat pack on cervical with towel between shirt and heat, supine with BLE elevated on large bolster   Therapeutic Procedure/Exercise   Therapeutic Procedures: strength, endurance, ROM, flexibility minutes (10973) 25   Ther Proc 1 UBE 6mins   Ther Proc 2 supine towel stretch 3mins   Ther Proc 3 instructed on doorway stretch   Ther Proc 4 supine Ts, diagonals, ER YTB [id number] each   Skilled Intervention cues, exercise selection   Patient Response/Progress painfree   Manual Therapy   Manual Therapy: Mobilization, MFR, MLD, friction massage minutes (73203) 10   Manual Therapy Manual Therapy 2   Manual Therapy 1 grade 1 sideglides C3-6, intermittent manual traction   Skilled Intervention manual   Patient Response/Progress painfree   Plan   Plan for next session next row, pulldowns   Total Session Time   Timed Code Treatment Minutes 35   Total Treatment Time (sum of timed and untimed services) 45       NDI: 24(no change)    PLAN  Pt has been seen for 3 PT " visits over the past 9weeks without significant change, but admits she hasn't been consistent with all exercises due to being busy. Plan to continue PT 2x/month x 9visits.    Beginning/End Dates of Progress Note Reporting Period:  8/7/24 to 10/09/2024    Referring Provider:  Ayala Fields

## 2024-11-25 ENCOUNTER — THERAPY VISIT (OUTPATIENT)
Dept: PHYSICAL THERAPY | Facility: CLINIC | Age: 49
End: 2024-11-25
Payer: COMMERCIAL

## 2024-11-25 DIAGNOSIS — M54.2 NECK PAIN: Primary | ICD-10-CM

## 2024-11-25 PROCEDURE — 97110 THERAPEUTIC EXERCISES: CPT | Mod: GP | Performed by: PHYSICAL THERAPIST

## 2024-11-25 PROCEDURE — 97140 MANUAL THERAPY 1/> REGIONS: CPT | Mod: GP | Performed by: PHYSICAL THERAPIST

## 2025-03-06 ENCOUNTER — ANCILLARY PROCEDURE (OUTPATIENT)
Dept: MAMMOGRAPHY | Facility: CLINIC | Age: 50
End: 2025-03-06
Payer: COMMERCIAL

## 2025-03-06 DIAGNOSIS — Z12.31 VISIT FOR SCREENING MAMMOGRAM: ICD-10-CM

## 2025-03-06 PROCEDURE — 77063 BREAST TOMOSYNTHESIS BI: CPT | Mod: GC | Performed by: RADIOLOGY

## 2025-03-06 PROCEDURE — 77067 SCR MAMMO BI INCL CAD: CPT | Mod: GC | Performed by: RADIOLOGY

## 2025-03-20 ENCOUNTER — ANCILLARY PROCEDURE (OUTPATIENT)
Dept: ULTRASOUND IMAGING | Facility: CLINIC | Age: 50
End: 2025-03-20
Attending: INTERNAL MEDICINE
Payer: COMMERCIAL

## 2025-03-20 ENCOUNTER — ANCILLARY PROCEDURE (OUTPATIENT)
Dept: MAMMOGRAPHY | Facility: CLINIC | Age: 50
End: 2025-03-20
Attending: INTERNAL MEDICINE
Payer: COMMERCIAL

## 2025-03-20 DIAGNOSIS — R92.8 ABNORMAL MAMMOGRAM: ICD-10-CM

## 2025-03-20 PROCEDURE — G0279 TOMOSYNTHESIS, MAMMO: HCPCS

## 2025-03-20 PROCEDURE — 77065 DX MAMMO INCL CAD UNI: CPT | Mod: LT

## 2025-03-20 PROCEDURE — 76642 ULTRASOUND BREAST LIMITED: CPT | Mod: LT

## 2025-03-28 ENCOUNTER — ANCILLARY PROCEDURE (OUTPATIENT)
Dept: MAMMOGRAPHY | Facility: CLINIC | Age: 50
End: 2025-03-28
Attending: INTERNAL MEDICINE
Payer: COMMERCIAL

## 2025-03-28 DIAGNOSIS — R92.8 ABNORMAL MAMMOGRAM: ICD-10-CM

## 2025-03-28 PROCEDURE — 19081 BX BREAST 1ST LESION STRTCTC: CPT | Mod: LT

## 2025-03-28 PROCEDURE — G0279 TOMOSYNTHESIS, MAMMO: HCPCS

## 2025-03-28 PROCEDURE — 77066 DX MAMMO INCL CAD BI: CPT

## 2025-03-28 PROCEDURE — 88305 TISSUE EXAM BY PATHOLOGIST: CPT | Performed by: PATHOLOGY

## 2025-03-28 RX ORDER — IOPAMIDOL 755 MG/ML
99 INJECTION, SOLUTION INTRAVASCULAR ONCE
Status: COMPLETED | OUTPATIENT
Start: 2025-03-28 | End: 2025-03-28

## 2025-03-28 RX ADMIN — IOPAMIDOL 99 ML: 755 INJECTION, SOLUTION INTRAVASCULAR at 10:18

## 2025-03-31 ENCOUNTER — TELEPHONE (OUTPATIENT)
Dept: MAMMOGRAPHY | Facility: CLINIC | Age: 50
End: 2025-03-31
Payer: COMMERCIAL

## 2025-03-31 LAB
PATH REPORT.COMMENTS IMP SPEC: NORMAL
PATH REPORT.COMMENTS IMP SPEC: NORMAL
PATH REPORT.FINAL DX SPEC: NORMAL
PATH REPORT.GROSS SPEC: NORMAL
PATH REPORT.MICROSCOPIC SPEC OTHER STN: NORMAL
PATH REPORT.RELEVANT HX SPEC: NORMAL
PHOTO IMAGE: NORMAL

## 2025-03-31 NOTE — TELEPHONE ENCOUNTER
Spoke with patient regarding left breast biopsy results, which indicate benign fibrocystic changes. Notified patient that the radiologist's recommendation is continued yearly screening mammogram. Patient verbalized understanding of these results and all questions answered to her satisfaction.